# Patient Record
Sex: FEMALE
[De-identification: names, ages, dates, MRNs, and addresses within clinical notes are randomized per-mention and may not be internally consistent; named-entity substitution may affect disease eponyms.]

---

## 2022-07-01 PROBLEM — Z00.00 ENCOUNTER FOR PREVENTIVE HEALTH EXAMINATION: Status: ACTIVE | Noted: 2022-07-01

## 2022-08-12 ENCOUNTER — RESULT REVIEW (OUTPATIENT)
Age: 57
End: 2022-08-12

## 2022-08-12 ENCOUNTER — APPOINTMENT (OUTPATIENT)
Dept: HEMATOLOGY ONCOLOGY | Facility: CLINIC | Age: 57
End: 2022-08-12

## 2022-08-12 VITALS
BODY MASS INDEX: 24.94 KG/M2 | WEIGHT: 142.5 LBS | RESPIRATION RATE: 16 BRPM | HEIGHT: 63.39 IN | SYSTOLIC BLOOD PRESSURE: 128 MMHG | DIASTOLIC BLOOD PRESSURE: 71 MMHG | HEART RATE: 61 BPM | TEMPERATURE: 98.2 F | OXYGEN SATURATION: 99 %

## 2022-08-12 DIAGNOSIS — Z80.41 FAMILY HISTORY OF MALIGNANT NEOPLASM OF OVARY: ICD-10-CM

## 2022-08-12 DIAGNOSIS — Z80.52 FAMILY HISTORY OF MALIGNANT NEOPLASM OF BLADDER: ICD-10-CM

## 2022-08-12 DIAGNOSIS — Z80.1 FAMILY HISTORY OF MALIGNANT NEOPLASM OF TRACHEA, BRONCHUS AND LUNG: ICD-10-CM

## 2022-08-12 DIAGNOSIS — Z80.42 FAMILY HISTORY OF MALIGNANT NEOPLASM OF PROSTATE: ICD-10-CM

## 2022-08-12 DIAGNOSIS — Z80.7 FAMILY HISTORY OF OTHER MALIGNANT NEOPLASMS OF LYMPHOID, HEMATOPOIETIC AND RELATED TISSUES: ICD-10-CM

## 2022-08-12 DIAGNOSIS — Z80.0 FAMILY HISTORY OF MALIGNANT NEOPLASM OF DIGESTIVE ORGANS: ICD-10-CM

## 2022-08-12 DIAGNOSIS — Z80.3 FAMILY HISTORY OF MALIGNANT NEOPLASM OF BREAST: ICD-10-CM

## 2022-08-12 DIAGNOSIS — Z87.19 PERSONAL HISTORY OF OTHER DISEASES OF THE DIGESTIVE SYSTEM: ICD-10-CM

## 2022-08-12 DIAGNOSIS — Z86.39 PERSONAL HISTORY OF OTHER ENDOCRINE, NUTRITIONAL AND METABOLIC DISEASE: ICD-10-CM

## 2022-08-12 PROCEDURE — 36415 COLL VENOUS BLD VENIPUNCTURE: CPT

## 2022-08-12 PROCEDURE — 99205 OFFICE O/P NEW HI 60 MIN: CPT | Mod: 25

## 2022-08-12 RX ORDER — RIBOFLAVIN (VITAMIN B2) 100 MG
100 TABLET ORAL
Refills: 0 | Status: ACTIVE | COMMUNITY
Start: 2022-08-12

## 2022-08-12 RX ORDER — ASPIRIN 81 MG/1
81 TABLET, CHEWABLE ORAL
Refills: 0 | Status: ACTIVE | COMMUNITY
Start: 2022-08-12

## 2022-08-12 RX ORDER — ADHESIVE TAPE 3"X 2.3 YD
500 TAPE, NON-MEDICATED TOPICAL
Refills: 0 | Status: ACTIVE | COMMUNITY
Start: 2022-08-12

## 2022-08-12 RX ORDER — ADHESIVE TAPE 3"X 2.3 YD
50 MCG TAPE, NON-MEDICATED TOPICAL
Refills: 0 | Status: ACTIVE | COMMUNITY
Start: 2022-08-12

## 2022-08-12 RX ORDER — ATORVASTATIN CALCIUM 10 MG/1
10 TABLET, FILM COATED ORAL DAILY
Refills: 0 | Status: ACTIVE | COMMUNITY
Start: 2022-08-12

## 2022-08-12 RX ORDER — PSYLLIUM HUSK 100 %
POWDER (GRAM) MISCELLANEOUS
Refills: 0 | Status: ACTIVE | COMMUNITY
Start: 2022-08-12

## 2022-08-12 RX ORDER — MAGNESIUM OXIDE 241.3 MG/1000MG
400 TABLET ORAL
Refills: 0 | Status: ACTIVE | COMMUNITY
Start: 2022-08-12

## 2022-08-12 RX ORDER — FLUOCINONIDE 0.05 MG/G
0.05 OINTMENT TOPICAL
Qty: 60 | Refills: 0 | Status: ACTIVE | COMMUNITY
Start: 2022-06-30

## 2022-08-12 NOTE — CONSULT LETTER
[Dear  ___] : Dear  [unfilled], [Consult Letter:] : I had the pleasure of evaluating your patient, [unfilled]. [Please see my note below.] : Please see my note below. [Consult Closing:] : Thank you very much for allowing me to participate in the care of this patient.  If you have any questions, please do not hesitate to contact me. [Sincerely,] : Sincerely, [FreeTextEntry3] : Ifrah Holloway DO, FACAARON, FACP\par Medical Oncology and Hematology\par [facility] [place]\par

## 2022-08-12 NOTE — ASSESSMENT
[FreeTextEntry1] : # basophilia\par reviewed records since 2019\par Improved diet and CRP markedly improved.\par Elevation of basophils may represent an underlying neoplasm such as chronic myeloid leukemia (CML), polycythemia vera (PV), primary myelofibrosis, essential thrombocythemia, acute myeloid leukemia, or rarely solid tumors. Will check flow, BCR ABL, MPNR. More common causes include allergic reactions or chronic inflammation related to infections (including influenza and tuberculosis), inflammatory bowel disease, and autoimmune disease. Check ESR/CRP/ANALI/CTDC/ CCP/ quant gold, mold and allergy work up. Drug-related causes and food ingestion also correlate with symptoms and degree of basophilia\par \par #fatty liver and HLD\par improved since changing diet completely - avoids sugars and dairy.\par \par Telehealth 2 weeks.

## 2022-08-12 NOTE — HISTORY OF PRESENT ILLNESS
[de-identified] : Ms. Lozano is a 56 year old woman who presents for initial consultation of elevated basophils.\par  bit by tick, + babesia\par 2019 basophils 2.4%\par She referred by Dr. Calle; blood work dated 2022 revealed basophils 2.1%.\par \par She reports pain and  swelling to left lower leg (x2 years, dopplers negative), and day/night sweats.\par \par Age of Menarche: 15\par LMP: 54\par OCP/HRT\par , miscarriage x1, 29 at first pregnancy\par \par Health Maintenance:\par [facility] \par PAP 10/2021\par DEXA

## 2022-08-25 ENCOUNTER — APPOINTMENT (OUTPATIENT)
Dept: HEMATOLOGY ONCOLOGY | Facility: CLINIC | Age: 57
End: 2022-08-25

## 2022-08-25 DIAGNOSIS — R79.89 OTHER SPECIFIED ABNORMAL FINDINGS OF BLOOD CHEMISTRY: ICD-10-CM

## 2022-08-25 PROCEDURE — 99215 OFFICE O/P EST HI 40 MIN: CPT | Mod: 95

## 2022-08-25 NOTE — CONSULT LETTER
[FreeTextEntry3] : Ifrah Holloway DO, FACAARON, FACP\par Medical Oncology and Hematology\par [facility] [place]\par

## 2022-08-25 NOTE — HISTORY OF PRESENT ILLNESS
[Home] : at home, [unfilled] , at the time of the visit. [Medical Office: ([facility])___] : at the medical office located in  [Verbal consent obtained from patient] : the patient, [unfilled] [de-identified] : Ms. Lozano is a 56 year old woman who presents for initial consultation of elevated basophils.\par  bit by tick, + babesia\par 2019 basophils 2.4%\par She referred by Dr. Calle; blood work dated 2022 revealed basophils 2.1%.\par \par She reports pain and  swelling to left lower leg (x2 years, dopplers negative), and day/night sweats.\par \par Age of Menarche: 15\par LMP: 54\par OCP/HRT\par , miscarriage x1, 29 at first pregnancy\par \par Health Maintenance:\par [facility] \par PAP 10/2021\par DEXA  [de-identified] : Patient seen via telehealth\par feeling well. no complaints\par going to have a cyst removed from leg

## 2022-08-25 NOTE — ASSESSMENT
[FreeTextEntry1] : # basophilia\par reviewed records since 2019\par Improved diet and CRP markedly improved.\par ESR/CRP wnl\par CBC reviewed. ABC 0.12 ( normal 0.1).\par flow normal, \par BCR ABL negative, \par MPNR negative\par influenza/COVID/ RSV - negative\par food allergy panel negative\par mold allergy panel + for  [employer] - unsure if this can be contributing to basophilia - given name of allergist, Dr. Cesar Vicente.\par ANALI neg/CTDC neg/ CCP neg\par quant gold - neg\par Will continue to monitor - I do not believe we need to do a BM bx at this time\par \par #fatty liver and HLD\par improved since changing diet completely - avoids sugars and dairy.\par \par RTC in 4 months with cbc with diff, cmp, ESR/CRP.

## 2022-08-26 ENCOUNTER — APPOINTMENT (OUTPATIENT)
Dept: HEMATOLOGY ONCOLOGY | Facility: CLINIC | Age: 57
End: 2022-08-26

## 2022-12-12 ENCOUNTER — APPOINTMENT (OUTPATIENT)
Dept: HEMATOLOGY ONCOLOGY | Facility: CLINIC | Age: 57
End: 2022-12-12

## 2022-12-12 ENCOUNTER — RESULT REVIEW (OUTPATIENT)
Age: 57
End: 2022-12-12

## 2022-12-12 VITALS
OXYGEN SATURATION: 99 % | RESPIRATION RATE: 16 BRPM | TEMPERATURE: 97.9 F | HEART RATE: 59 BPM | DIASTOLIC BLOOD PRESSURE: 60 MMHG | HEIGHT: 63.39 IN | WEIGHT: 140.5 LBS | BODY MASS INDEX: 24.59 KG/M2 | SYSTOLIC BLOOD PRESSURE: 124 MMHG

## 2022-12-12 PROCEDURE — 99214 OFFICE O/P EST MOD 30 MIN: CPT | Mod: 25

## 2022-12-12 PROCEDURE — 36415 COLL VENOUS BLD VENIPUNCTURE: CPT

## 2022-12-12 NOTE — CONSULT LETTER
[Dear  ___] : Dear  [unfilled], [Consult Letter:] : I had the pleasure of evaluating your patient, [unfilled]. [Please see my note below.] : Please see my note below. [Consult Closing:] : Thank you very much for allowing me to participate in the care of this patient.  If you have any questions, please do not hesitate to contact me. [Sincerely,] : Sincerely, [FreeTextEntry3] : Ifrah Holloway DO, FACAARON, FACP\par Medical Oncology and Hematology\par [facility] [place]\par

## 2022-12-12 NOTE — ASSESSMENT
[FreeTextEntry1] : # basophilia\par reviewed records since 2019\par Improved diet and CRP markedly improved.\par ESR/CRP wnl\par CBC reviewed. ABC 0.12 ( normal 0.1).\par flow normal, \par BCR ABL negative, \par MPNR negative\par influenza/COVID/ RSV - negative\par food allergy panel negative\par mold allergy panel + for  [employer] - unsure if this can be contributing to basophilia - given name of allergist, Dr. Cesar Vicente.\par ANALI neg/CTDC neg/ CCP neg\par quant gold - neg\par vs and labs reviewed. labs drawn in office today. Wbc 4.46, hgb 13.6, plts, 280k, ABC 0.11. \par Will continue to monitor - I do not believe we need to do a BM bx at this time given the slight elevation in basophilia and I believe we can continue to monitor however I did discuss that I can not 100% rule out a primary bone marrow disorder and if she wants to understand definitively that nothing is concerning in the bone marrow we can arrange a biopsy - She will think about this more and decide. \par \par #fatty liver and HLD\par improved since changing diet completely - avoids sugars and dairy.\par \par #rash on shin\par follows with Derm\par \par RTC in 6-7 months with cbc with diff, cmp, ESR/CRP.

## 2022-12-12 NOTE — HISTORY OF PRESENT ILLNESS
[Home] : at home, [unfilled] , at the time of the visit. [Medical Office: ([facility])___] : at the medical office located in  [Verbal consent obtained from patient] : the patient, [unfilled] [de-identified] : Ms. Lozano is a 56 year old woman who presents for initial consultation of elevated basophils.\par  bit by tick, + babesia\par 2019 basophils 2.4%\par She referred by Dr. Calle; blood work dated 2022 revealed basophils 2.1%.\par \par She reports pain and  swelling to left lower leg (x2 years, dopplers negative), and day/night sweats.\par \par Age of Menarche: 15\par LMP: 54\par OCP/HRT\par , miscarriage x1, 29 at first pregnancy\par \par Health Maintenance:\par [facility] \par PAP 10/2021\par DEXA  [de-identified] : Patient seen and examined and here today for follow up\par feeling well. no complaints\par

## 2023-07-10 ENCOUNTER — RESULT REVIEW (OUTPATIENT)
Age: 58
End: 2023-07-10

## 2023-07-10 ENCOUNTER — APPOINTMENT (OUTPATIENT)
Dept: HEMATOLOGY ONCOLOGY | Facility: CLINIC | Age: 58
End: 2023-07-10
Payer: COMMERCIAL

## 2023-07-10 VITALS
BODY MASS INDEX: 26.96 KG/M2 | WEIGHT: 154.06 LBS | SYSTOLIC BLOOD PRESSURE: 115 MMHG | HEIGHT: 63.39 IN | HEART RATE: 57 BPM | TEMPERATURE: 98.1 F | DIASTOLIC BLOOD PRESSURE: 62 MMHG | RESPIRATION RATE: 16 BRPM | OXYGEN SATURATION: 99 %

## 2023-07-10 DIAGNOSIS — R61 GENERALIZED HYPERHIDROSIS: ICD-10-CM

## 2023-07-10 PROCEDURE — 36415 COLL VENOUS BLD VENIPUNCTURE: CPT

## 2023-07-10 PROCEDURE — 99214 OFFICE O/P EST MOD 30 MIN: CPT | Mod: 25

## 2023-07-10 RX ORDER — CYCLOBENZAPRINE HYDROCHLORIDE 5 MG/1
5 TABLET, FILM COATED ORAL
Qty: 30 | Refills: 0 | Status: COMPLETED | COMMUNITY
Start: 2022-05-12 | End: 2023-07-10

## 2023-07-10 RX ORDER — ASCORBIC ACID 1000 MG
10 TABLET ORAL
Refills: 0 | Status: COMPLETED | COMMUNITY
Start: 2022-08-12 | End: 2023-07-10

## 2023-07-10 RX ORDER — ZINC SULFATE 50(220)MG
220 (50 ZN) CAPSULE ORAL
Refills: 0 | Status: COMPLETED | COMMUNITY
Start: 2022-08-12 | End: 2023-07-10

## 2023-07-10 NOTE — CONSULT LETTER
[Dear  ___] : Dear  [unfilled], [Consult Letter:] : I had the pleasure of evaluating your patient, [unfilled]. [Please see my note below.] : Please see my note below. [Consult Closing:] : Thank you very much for allowing me to participate in the care of this patient.  If you have any questions, please do not hesitate to contact me. [Sincerely,] : Sincerely, [FreeTextEntry3] : Ifrah Holloway DO, FACAARON, FACP\par Medical Oncology and Hematology\par [facility] [place]\par

## 2023-07-10 NOTE — HISTORY OF PRESENT ILLNESS
[de-identified] : Ms. Lozano is a 56 year old woman who presents for initial consultation of elevated basophils.\par  bit by tick, + babesia\par 2019 basophils 2.4%\par She referred by Dr. Calle; blood work dated 2022 revealed basophils 2.1%.\par \par She reports pain and  swelling to left lower leg (x2 years, dopplers negative), and day/night sweats.\par \par Age of Menarche: 15\par LMP: 54\par OCP/HRT\par , miscarriage x1, 29 at first pregnancy\par \par Health Maintenance:\par [facility] \par PAP 10/2021\par DEXA  [de-identified] : Patient seen and examined and here today for follow up\par feeling well. no complaints\par States she has been having night sweats. Denies fevers and weightloss. Also states she has noticed swelling in hands and lower extremities, believes retaining water. \par States leg cramping with activity and rest. Noticed increase in weight even though keeping same diet. States has had abdominal scan in [facility] since last visit.\par Mammogram and sonogram done since last visit at [place] as well and all scans came back good.  has been taking herbal pill Da Lisandro Tyler (3 pills TID) for the last 2 weeks.

## 2023-10-02 ENCOUNTER — RESULT REVIEW (OUTPATIENT)
Age: 58
End: 2023-10-02

## 2023-10-02 ENCOUNTER — APPOINTMENT (OUTPATIENT)
Dept: HEMATOLOGY ONCOLOGY | Facility: CLINIC | Age: 58
End: 2023-10-02

## 2023-10-02 VITALS
OXYGEN SATURATION: 99 % | RESPIRATION RATE: 16 BRPM | HEART RATE: 60 BPM | BODY MASS INDEX: 28.56 KG/M2 | HEIGHT: 63.39 IN | WEIGHT: 163.19 LBS | DIASTOLIC BLOOD PRESSURE: 63 MMHG | SYSTOLIC BLOOD PRESSURE: 108 MMHG | TEMPERATURE: 97.2 F

## 2023-10-11 ENCOUNTER — APPOINTMENT (OUTPATIENT)
Dept: HEMATOLOGY ONCOLOGY | Facility: CLINIC | Age: 58
End: 2023-10-11
Payer: COMMERCIAL

## 2023-10-11 DIAGNOSIS — D89.89 OTHER SPECIFIED DISORDERS INVOLVING THE IMMUNE MECHANISM, NOT ELSEWHERE CLASSIFIED: ICD-10-CM

## 2023-10-11 DIAGNOSIS — D72.824 BASOPHILIA: ICD-10-CM

## 2023-10-11 PROCEDURE — 99213 OFFICE O/P EST LOW 20 MIN: CPT | Mod: 95

## 2023-10-12 PROBLEM — D72.824 BASOPHILIA: Status: ACTIVE | Noted: 2022-08-12

## 2023-10-12 PROBLEM — D89.89 KAPPA LIGHT CHAIN DISEASE: Status: ACTIVE | Noted: 2023-07-10
